# Patient Record
Sex: FEMALE | Race: WHITE | Employment: FULL TIME | URBAN - METROPOLITAN AREA
[De-identification: names, ages, dates, MRNs, and addresses within clinical notes are randomized per-mention and may not be internally consistent; named-entity substitution may affect disease eponyms.]

---

## 2017-02-02 ENCOUNTER — APPOINTMENT (OUTPATIENT)
Dept: GENERAL RADIOLOGY | Facility: HOSPITAL | Age: 42
End: 2017-02-02
Attending: EMERGENCY MEDICINE
Payer: COMMERCIAL

## 2017-02-02 ENCOUNTER — HOSPITAL ENCOUNTER (EMERGENCY)
Facility: HOSPITAL | Age: 42
Discharge: HOME | End: 2017-02-02
Attending: EMERGENCY MEDICINE
Payer: COMMERCIAL

## 2017-02-02 VITALS
WEIGHT: 170 LBS | TEMPERATURE: 98 F | OXYGEN SATURATION: 100 % | RESPIRATION RATE: 16 BRPM | HEIGHT: 63 IN | BODY MASS INDEX: 30.12 KG/M2 | SYSTOLIC BLOOD PRESSURE: 115 MMHG | DIASTOLIC BLOOD PRESSURE: 74 MMHG | HEART RATE: 80 BPM

## 2017-02-02 DIAGNOSIS — M54.50 BACK PAIN, LUMBOSACRAL: Primary | ICD-10-CM

## 2017-02-02 PROCEDURE — 99283 EMERGENCY DEPT VISIT LOW MDM: CPT

## 2017-02-02 PROCEDURE — 72220 X-RAY EXAM SACRUM TAILBONE: CPT

## 2017-02-02 PROCEDURE — 72110 X-RAY EXAM L-2 SPINE 4/>VWS: CPT

## 2017-02-02 RX ORDER — IBUPROFEN 600 MG/1
600 TABLET ORAL EVERY 8 HOURS PRN
Qty: 30 TABLET | Refills: 0 | Status: SHIPPED | OUTPATIENT
Start: 2017-02-02 | End: 2017-02-09

## 2017-02-02 RX ORDER — HYDROCODONE BITARTRATE AND ACETAMINOPHEN 5; 325 MG/1; MG/1
1-2 TABLET ORAL EVERY 4 HOURS PRN
Qty: 10 TABLET | Refills: 0 | Status: SHIPPED | OUTPATIENT
Start: 2017-02-02 | End: 2017-02-09

## 2017-02-02 NOTE — ED PROVIDER NOTES
Patient Seen in: [facility] Emergency Department    History   Patient presents with:  Back Pain (musculoskeletal)    Stated Complaint: back pain post fall    HPI    53-year-old white female who presents to the emergency room today for complaint of b Alcohol Use: Yes                Comment: Soc      Review of Systems    Positive for stated complaint: back pain post fall  Other systems are as noted in HPI. Constitutional and vital signs reviewed.       All other systems reviewed and negative except as n Impression:      CONCLUSION:  Grade 1 spondylolisthesis L5-S1 with suggestion of right-sided spondylolysis.     Degenerative disc disease L4-5.     IUD incidentally noted in the pelvis .           Sacrococcyx x-rays revealed:  Impression:      CONCLUSION

## 2017-02-02 NOTE — ED NOTES
Discharge paperwork reviewed with pt, copy given. Pt given prescriptions for Norco and ibuprofen. Instructed on Norco use. No questions at time of discharge.

## 2017-02-02 NOTE — ED INITIAL ASSESSMENT (HPI)
Pt slipped this morning and fell on to her tailbone causing pain to her lower back. Pt states she already has a herniated disc to her lower back that always has some discomfort but this pain is more severe.  Denies any weakness to extremities

## 2017-10-19 ENCOUNTER — OFFICE VISIT (OUTPATIENT)
Dept: FAMILY MEDICINE CLINIC | Facility: CLINIC | Age: 42
End: 2017-10-19

## 2017-10-19 VITALS
DIASTOLIC BLOOD PRESSURE: 84 MMHG | HEIGHT: 63 IN | RESPIRATION RATE: 16 BRPM | BODY MASS INDEX: 31.54 KG/M2 | WEIGHT: 178 LBS | OXYGEN SATURATION: 98 % | HEART RATE: 92 BPM | SYSTOLIC BLOOD PRESSURE: 136 MMHG | TEMPERATURE: 99 F

## 2017-10-19 DIAGNOSIS — J02.9 SORE THROAT: Primary | ICD-10-CM

## 2017-10-19 DIAGNOSIS — J34.89 NASAL SORE: ICD-10-CM

## 2017-10-19 DIAGNOSIS — J03.90 TONSILLITIS: ICD-10-CM

## 2017-10-19 PROCEDURE — 87880 STREP A ASSAY W/OPTIC: CPT | Performed by: NURSE PRACTITIONER

## 2017-10-19 PROCEDURE — 99213 OFFICE O/P EST LOW 20 MIN: CPT | Performed by: NURSE PRACTITIONER

## 2017-10-19 RX ORDER — PREDNISONE 20 MG/1
40 TABLET ORAL DAILY
Qty: 6 TABLET | Refills: 0 | Status: SHIPPED | OUTPATIENT
Start: 2017-10-19 | End: 2017-10-22

## 2017-10-19 NOTE — PATIENT INSTRUCTIONS
· Please start Benadryl tonight for sleep if needed. · Tomorrow morning start Prednisone 40 mg daily for three days. May make you jumpy. Take Tylenol only for pain if needed, avoid NSAIDS such as Motrin/Aleve/Ibuprofen during prednisone use.   · Follow up throat, children can sip on juice or a popsicle. Children 5 years and older can also suck on a lollipop or hard candy. · Avoid salty or spicy foods, which can be irritating to the throat.   Follow-up care  Follow up with your healthcare provider or our sta

## 2017-10-20 NOTE — PROGRESS NOTES
HPI:    Patient ID: Saumya Carney is a 46year old female. Patient presents with complaint of sensation of a \"ball in my throat\" that started this afternoon.  States she did not pay much attention and was able to eat lunch, but on her way home on -STREP A ASSAY W/OPTIC   Collection Time: 10/19/17  6:49 PM   Result Value Ref Range   STREP GRP A CUL-SCR negative Negative   Control Line Present with a clear background (yes/no) yes Yes/No   Kit Lot # [id number] Numeric   Kit Expiration Date 11/30/18 Da · For children: Use acetaminophen for fever, fussiness or discomfort. In infants over six months of age, you may use ibuprofen instead of acetaminophen.  (NOTE: If your child has chronic liver or kidney disease or ever had a stomach ulcer or GI bleeding, ta · Signs of dehydration (very dark urine or no urine, sunken eyes, dizziness)  · Trouble breathing or noisy breathing  · Muffled voice  · New rash  · Child appears to be getting sicker  Date Last Reviewed: 4/13/2015  © 2595-1203 [employer].  8

## 2018-03-20 PROCEDURE — 88175 CYTOPATH C/V AUTO FLUID REDO: CPT | Performed by: OBSTETRICS & GYNECOLOGY

## 2018-03-20 PROCEDURE — 87624 HPV HI-RISK TYP POOLED RSLT: CPT | Performed by: OBSTETRICS & GYNECOLOGY

## 2018-05-21 ENCOUNTER — OFFICE VISIT (OUTPATIENT)
Dept: FAMILY MEDICINE CLINIC | Facility: CLINIC | Age: 43
End: 2018-05-21

## 2018-05-21 VITALS
HEIGHT: 63 IN | BODY MASS INDEX: 32.78 KG/M2 | OXYGEN SATURATION: 98 % | DIASTOLIC BLOOD PRESSURE: 80 MMHG | SYSTOLIC BLOOD PRESSURE: 142 MMHG | WEIGHT: 185 LBS | HEART RATE: 82 BPM | RESPIRATION RATE: 18 BRPM | TEMPERATURE: 99 F

## 2018-05-21 DIAGNOSIS — J06.9 UPPER RESPIRATORY TRACT INFECTION, UNSPECIFIED TYPE: Primary | ICD-10-CM

## 2018-05-21 PROCEDURE — 99213 OFFICE O/P EST LOW 20 MIN: CPT | Performed by: NURSE PRACTITIONER

## 2018-09-17 ENCOUNTER — OFFICE VISIT (OUTPATIENT)
Dept: FAMILY MEDICINE CLINIC | Facility: CLINIC | Age: 43
End: 2018-09-17
Payer: COMMERCIAL

## 2018-09-17 VITALS
OXYGEN SATURATION: 98 % | TEMPERATURE: 99 F | HEIGHT: 63 IN | WEIGHT: 191 LBS | DIASTOLIC BLOOD PRESSURE: 68 MMHG | SYSTOLIC BLOOD PRESSURE: 102 MMHG | HEART RATE: 72 BPM | BODY MASS INDEX: 33.84 KG/M2 | RESPIRATION RATE: 20 BRPM

## 2018-09-17 DIAGNOSIS — Z20.818 EXPOSURE TO STREP THROAT: Primary | ICD-10-CM

## 2018-09-17 DIAGNOSIS — J06.9 VIRAL URI: ICD-10-CM

## 2018-09-17 LAB
CONTROL LINE PRESENT WITH A CLEAR BACKGROUND (YES/NO): YES YES/NO
STREP GRP A CUL-SCR: NEGATIVE

## 2018-09-17 PROCEDURE — 87081 CULTURE SCREEN ONLY: CPT | Performed by: NURSE PRACTITIONER

## 2018-09-17 PROCEDURE — 87880 STREP A ASSAY W/OPTIC: CPT | Performed by: NURSE PRACTITIONER

## 2018-09-17 PROCEDURE — 99213 OFFICE O/P EST LOW 20 MIN: CPT | Performed by: NURSE PRACTITIONER

## 2018-09-17 NOTE — PROGRESS NOTES
CHIEF COMPLAINT:   Patient presents with:  Cold: congestion, exposed to strep        HPI:   Kayy Larsen is a 38year old female presents to clinic with complaint of sore throat. Symptoms began last night.   Pt was exposed to strep at home -  THROAT: oral mucosa pink, moist. Posterior pharynx not erythematous and injected. No exudates. Tonsils WNL. Breath not malodorous   NECK: supple  LUNGS: clear to auscultation bilaterally, no wheezes or rhonchi. Breathing is non labored.   CARDIO: RRR witho You have a viral upper respiratory illness (URI), which is another term for the common cold. This illness is contagious during the first few days. It is spread through the air by coughing and sneezing.  It may also be spread by direct contact (touching the · Cough with lots of colored sputum (mucus)  · Severe headache; face, neck, or ear pain  · Difficulty swallowing due to throat pain  · Fever of 100.4°F (38°C) or higher, or as directed by your healthcare provider  Call 911  Call 911 if any of these occur:

## 2018-12-02 ENCOUNTER — OFFICE VISIT (OUTPATIENT)
Dept: FAMILY MEDICINE CLINIC | Facility: CLINIC | Age: 43
End: 2018-12-02
Payer: COMMERCIAL

## 2018-12-02 VITALS
OXYGEN SATURATION: 98 % | WEIGHT: 190 LBS | TEMPERATURE: 98 F | RESPIRATION RATE: 20 BRPM | DIASTOLIC BLOOD PRESSURE: 82 MMHG | BODY MASS INDEX: 34 KG/M2 | SYSTOLIC BLOOD PRESSURE: 122 MMHG | HEART RATE: 88 BPM

## 2018-12-02 DIAGNOSIS — S90.811A ABRASION OF RIGHT FOOT, INITIAL ENCOUNTER: Primary | ICD-10-CM

## 2018-12-02 PROCEDURE — 99213 OFFICE O/P EST LOW 20 MIN: CPT | Performed by: NURSE PRACTITIONER

## 2018-12-02 NOTE — PATIENT INSTRUCTIONS
Abrasions  Abrasions are skin scrapes. Their treatment depends on how large and deep the abrasion is. Home care  You may be prescribed an antibiotic cream or ointment to apply to the wound. This helps prevent infection.  Follow instructions when using th · Increasing pain, redness, swelling, or drainage from the wound  · Bleeding from the wound that does not stop after a few minutes of steady, firm pressure  · Decreased ability to move any body part near the wound  Date Last Reviewed: 3/3/2017  © 5587-9279

## 2018-12-02 NOTE — PROGRESS NOTES
Patient presents with: Foot Injury: right foot      HPI:   Desire Edwards is a 38year old female presents to clinic after sustaining an injury to her right foot.   She reports visiting her neighbors late at night and thinks she stepped on a nail in th the skin or numbness.     EXAM:   /82   Pulse 88   Temp 97.8 °F (36.6 °C) (Oral)   Resp 20   Wt 190 lb   SpO2 98%   BMI 33.66 kg/m²   GENERAL: well developed, well nourished,in no apparent distress  SKIN: lateral aspect of right foot with a small shanice

## 2019-03-27 ENCOUNTER — OFFICE VISIT (OUTPATIENT)
Dept: FAMILY MEDICINE CLINIC | Facility: CLINIC | Age: 44
End: 2019-03-27
Payer: COMMERCIAL

## 2019-03-27 VITALS
BODY MASS INDEX: 32.25 KG/M2 | HEIGHT: 63 IN | OXYGEN SATURATION: 98 % | TEMPERATURE: 98 F | WEIGHT: 182 LBS | SYSTOLIC BLOOD PRESSURE: 122 MMHG | DIASTOLIC BLOOD PRESSURE: 78 MMHG | HEART RATE: 89 BPM

## 2019-03-27 DIAGNOSIS — J02.9 SORE THROAT: ICD-10-CM

## 2019-03-27 DIAGNOSIS — R05.9 COUGH IN ADULT: Primary | ICD-10-CM

## 2019-03-27 DIAGNOSIS — R09.82 POST-NASAL DRIP: ICD-10-CM

## 2019-03-27 PROCEDURE — 99213 OFFICE O/P EST LOW 20 MIN: CPT | Performed by: NURSE PRACTITIONER

## 2019-03-27 RX ORDER — BENZONATATE 200 MG/1
200 CAPSULE ORAL 3 TIMES DAILY PRN
Qty: 30 CAPSULE | Refills: 0 | Status: SHIPPED | OUTPATIENT
Start: 2019-03-27 | End: 2019-04-06

## 2019-03-27 NOTE — PATIENT INSTRUCTIONS
Self care for viral illnesses:  Benzonatate perles every eight hours with large glass of water for cough as needed. May make you drowsy. · Salt water gargles (1 tsp.  Salt in 6 oz lukewarm water), use several times daily to help decrease swelling and pain

## 2019-03-27 NOTE — PROGRESS NOTES
HPI:   Selwyn Davila is a 41year old female who presents with ill symptoms for  2  weeks. Patient reports began as congestion and sneezing, mostly resolved, with now cough keeping awake and some left sided sore throat.  Has tried Motrin with mild inte benzonatate 200 MG Oral Cap; Take 1 capsule (200 mg total) by mouth 3 (three) times daily as needed for cough. Sore throat    Post-nasal drip      Benzonatate for cough. Self care discussed. Medication use and risk/benefit discussed.  Patient is advised

## 2019-04-15 ENCOUNTER — OFFICE VISIT (OUTPATIENT)
Dept: FAMILY MEDICINE CLINIC | Facility: CLINIC | Age: 44
End: 2019-04-15
Payer: COMMERCIAL

## 2019-04-15 VITALS
HEART RATE: 78 BPM | OXYGEN SATURATION: 98 % | BODY MASS INDEX: 32 KG/M2 | WEIGHT: 179 LBS | RESPIRATION RATE: 16 BRPM | DIASTOLIC BLOOD PRESSURE: 90 MMHG | TEMPERATURE: 99 F | SYSTOLIC BLOOD PRESSURE: 130 MMHG

## 2019-04-15 DIAGNOSIS — J01.00 ACUTE NON-RECURRENT MAXILLARY SINUSITIS: Primary | ICD-10-CM

## 2019-04-15 PROCEDURE — 99213 OFFICE O/P EST LOW 20 MIN: CPT | Performed by: NURSE PRACTITIONER

## 2019-04-15 RX ORDER — AMOXICILLIN AND CLAVULANATE POTASSIUM 875; 125 MG/1; MG/1
1 TABLET, FILM COATED ORAL 2 TIMES DAILY
Qty: 20 TABLET | Refills: 0 | Status: SHIPPED | OUTPATIENT
Start: 2019-04-15 | End: 2019-04-25

## 2019-04-15 NOTE — PROGRESS NOTES
CHIEF COMPLAINT:   Patient presents with:  Cough: cough, sore throat, post nasal drip       HPI:   Devi Babcock is a 41year old female who presents for cold symptoms for  5  weeks.  Symptoms have progressed into sinus congestion and been worsening si BMI 31.71 kg/m²   GENERAL: well developed, well nourished,in no apparent distress  SKIN: no rashes,no suspicious lesions  HEAD: atraumatic, normocephalic, +  tenderness on palpation of maxillary sinuses  EYES: conjunctiva clear, EOM intact  EARS: TM's pear

## 2019-11-15 ENCOUNTER — OFFICE VISIT (OUTPATIENT)
Dept: FAMILY MEDICINE CLINIC | Facility: CLINIC | Age: 44
End: 2019-11-15
Payer: COMMERCIAL

## 2019-11-15 VITALS
TEMPERATURE: 99 F | DIASTOLIC BLOOD PRESSURE: 76 MMHG | RESPIRATION RATE: 20 BRPM | BODY MASS INDEX: 32.43 KG/M2 | HEIGHT: 63 IN | WEIGHT: 183 LBS | OXYGEN SATURATION: 97 % | SYSTOLIC BLOOD PRESSURE: 122 MMHG | HEART RATE: 86 BPM

## 2019-11-15 DIAGNOSIS — R07.89 CHEST PRESSURE: Primary | ICD-10-CM

## 2019-11-15 DIAGNOSIS — F17.200 SMOKER: ICD-10-CM

## 2019-11-15 PROCEDURE — 99213 OFFICE O/P EST LOW 20 MIN: CPT | Performed by: FAMILY MEDICINE

## 2019-11-15 NOTE — PROGRESS NOTES
Claude Priest is a 41year old female. S:  Patient presents today with the following concerns:  Chest Pain (left side of chest, left hand tingling x1day)    Now feeling pressure across entire chest.  No dyspnea or wheezing.   Smokes 3 cigarettes per the defined types were placed in this encounter. Meds & Refills for this Visit:  Requested Prescriptions      No prescriptions requested or ordered in this encounter     Imaging & Consults:  None    Discussed limitations of [place].   Unable to fully work up

## 2020-09-18 ENCOUNTER — LAB ENCOUNTER (OUTPATIENT)
Dept: LAB | Age: 45
End: 2020-09-18
Attending: INTERNAL MEDICINE
Payer: COMMERCIAL

## 2020-09-18 DIAGNOSIS — R10.10 PAIN OF UPPER ABDOMEN: ICD-10-CM

## 2020-09-18 DIAGNOSIS — R14.0 BLOATING: ICD-10-CM

## 2020-09-18 LAB
ALBUMIN SERPL-MCNC: 3.6 G/DL (ref 3.4–5)
ALBUMIN/GLOB SERPL: 0.8 {RATIO} (ref 1–2)
ALP LIVER SERPL-CCNC: 70 U/L (ref 37–98)
ALT SERPL-CCNC: 29 U/L (ref 13–56)
ANION GAP SERPL CALC-SCNC: 5 MMOL/L (ref 0–18)
AST SERPL-CCNC: 24 U/L (ref 15–37)
BASOPHILS # BLD AUTO: 0.04 X10(3) UL (ref 0–0.2)
BASOPHILS NFR BLD AUTO: 0.5 %
BILIRUB SERPL-MCNC: 0.5 MG/DL (ref 0.1–2)
BUN BLD-MCNC: 13 MG/DL (ref 7–18)
BUN/CREAT SERPL: 20 (ref 10–20)
CALCIUM BLD-MCNC: 9.3 MG/DL (ref 8.5–10.1)
CHLORIDE SERPL-SCNC: 104 MMOL/L (ref 98–112)
CO2 SERPL-SCNC: 29 MMOL/L (ref 21–32)
CREAT BLD-MCNC: 0.65 MG/DL (ref 0.55–1.02)
DEPRECATED RDW RBC AUTO: 43.7 FL (ref 35.1–46.3)
EOSINOPHIL # BLD AUTO: 0.13 X10(3) UL (ref 0–0.7)
EOSINOPHIL NFR BLD AUTO: 1.6 %
ERYTHROCYTE [DISTWIDTH] IN BLOOD BY AUTOMATED COUNT: 13.1 % (ref 11–15)
GLOBULIN PLAS-MCNC: 4.6 G/DL (ref 2.8–4.4)
GLUCOSE BLD-MCNC: 91 MG/DL (ref 70–99)
HCT VFR BLD AUTO: 39.5 % (ref 35–48)
HGB BLD-MCNC: 12.9 G/DL (ref 12–16)
IGA SERPL-MCNC: 311 MG/DL (ref 70–312)
IMM GRANULOCYTES # BLD AUTO: 0.02 X10(3) UL (ref 0–1)
IMM GRANULOCYTES NFR BLD: 0.2 %
LIPASE SERPL-CCNC: 154 U/L (ref 73–393)
LYMPHOCYTES # BLD AUTO: 2.41 X10(3) UL (ref 1–4)
LYMPHOCYTES NFR BLD AUTO: 29.4 %
M PROTEIN MFR SERPL ELPH: 8.2 G/DL (ref 6.4–8.2)
MCH RBC QN AUTO: 30.1 PG (ref 26–34)
MCHC RBC AUTO-ENTMCNC: 32.7 G/DL (ref 31–37)
MCV RBC AUTO: 92.3 FL (ref 80–100)
MONOCYTES # BLD AUTO: 0.42 X10(3) UL (ref 0.1–1)
MONOCYTES NFR BLD AUTO: 5.1 %
NEUTROPHILS # BLD AUTO: 5.18 X10 (3) UL (ref 1.5–7.7)
NEUTROPHILS # BLD AUTO: 5.18 X10(3) UL (ref 1.5–7.7)
NEUTROPHILS NFR BLD AUTO: 63.2 %
OSMOLALITY SERPL CALC.SUM OF ELEC: 286 MOSM/KG (ref 275–295)
PATIENT FASTING Y/N/NP: YES
PLATELET # BLD AUTO: 336 10(3)UL (ref 150–450)
POTASSIUM SERPL-SCNC: 3.6 MMOL/L (ref 3.5–5.1)
RBC # BLD AUTO: 4.28 X10(6)UL (ref 3.8–5.3)
SODIUM SERPL-SCNC: 138 MMOL/L (ref 136–145)
WBC # BLD AUTO: 8.2 X10(3) UL (ref 4–11)

## 2020-09-18 PROCEDURE — 80053 COMPREHEN METABOLIC PANEL: CPT

## 2020-09-18 PROCEDURE — 83690 ASSAY OF LIPASE: CPT

## 2020-09-18 PROCEDURE — 83516 IMMUNOASSAY NONANTIBODY: CPT

## 2020-09-18 PROCEDURE — 36415 COLL VENOUS BLD VENIPUNCTURE: CPT

## 2020-09-18 PROCEDURE — 82784 ASSAY IGA/IGD/IGG/IGM EACH: CPT

## 2020-09-18 PROCEDURE — 85025 COMPLETE CBC W/AUTO DIFF WBC: CPT

## 2020-09-23 LAB — TTG IGA SER-ACNC: 0.5 U/ML (ref ?–7)

## 2020-09-30 ENCOUNTER — APPOINTMENT (OUTPATIENT)
Dept: LAB | Age: 45
End: 2020-09-30
Attending: INTERNAL MEDICINE
Payer: COMMERCIAL

## 2020-09-30 DIAGNOSIS — Z01.818 PRE-OP TESTING: ICD-10-CM

## 2020-10-02 PROBLEM — R14.0 ABDOMINAL BLOATING: Status: ACTIVE | Noted: 2020-10-02

## 2020-10-02 PROBLEM — R12 CHRONIC HEARTBURN: Status: ACTIVE | Noted: 2020-10-02

## 2025-02-03 ENCOUNTER — APPOINTMENT (OUTPATIENT)
Dept: GENERAL RADIOLOGY | Age: 50
End: 2025-02-03
Attending: NURSE PRACTITIONER
Payer: COMMERCIAL

## 2025-02-03 ENCOUNTER — APPOINTMENT (OUTPATIENT)
Dept: ULTRASOUND IMAGING | Age: 50
End: 2025-02-03
Attending: NURSE PRACTITIONER
Payer: COMMERCIAL

## 2025-02-03 ENCOUNTER — HOSPITAL ENCOUNTER (OUTPATIENT)
Age: 50
Discharge: ANOTHER HOSPITAL | End: 2025-02-03
Payer: COMMERCIAL

## 2025-02-03 VITALS
BODY MASS INDEX: 31.01 KG/M2 | DIASTOLIC BLOOD PRESSURE: 102 MMHG | HEART RATE: 82 BPM | WEIGHT: 175 LBS | OXYGEN SATURATION: 98 % | RESPIRATION RATE: 16 BRPM | TEMPERATURE: 99 F | HEIGHT: 63 IN | SYSTOLIC BLOOD PRESSURE: 152 MMHG

## 2025-02-03 DIAGNOSIS — S89.92XA INJURY OF LEFT LOWER EXTREMITY, INITIAL ENCOUNTER: ICD-10-CM

## 2025-02-03 DIAGNOSIS — S82.892A CLOSED FRACTURE OF LEFT ANKLE, INITIAL ENCOUNTER: ICD-10-CM

## 2025-02-03 DIAGNOSIS — M79.89 LEFT LEG SWELLING: Primary | ICD-10-CM

## 2025-02-03 PROCEDURE — 99203 OFFICE O/P NEW LOW 30 MIN: CPT | Performed by: NURSE PRACTITIONER

## 2025-02-03 PROCEDURE — 73610 X-RAY EXAM OF ANKLE: CPT | Performed by: NURSE PRACTITIONER

## 2025-02-03 PROCEDURE — 29515 APPLICATION SHORT LEG SPLINT: CPT | Performed by: NURSE PRACTITIONER

## 2025-02-03 PROCEDURE — 73630 X-RAY EXAM OF FOOT: CPT | Performed by: NURSE PRACTITIONER

## 2025-02-03 RX ORDER — HYDROCODONE BITARTRATE AND ACETAMINOPHEN 5; 325 MG/1; MG/1
1 TABLET ORAL ONCE
Status: COMPLETED | OUTPATIENT
Start: 2025-02-03 | End: 2025-02-03

## 2025-02-03 NOTE — ED PROVIDER NOTES
Patient Seen in: [facility]      History     Chief Complaint   Patient presents with    Leg or Foot Injury     Stated Complaint: l ankle injury    Subjective:   49-year-old female, who presents with left lower leg pain.  States she initially injured her left ankle/foot a week ago.  States she did this by missing the last stair and rolling her ankle.  States she did not come in right away because she did not have insurance done.  She now has insurance.  She is also concerned because now the whole left lower leg is also swollen.  Has been taking Advil with minimal relief.  She is not complaining of difficulty breathing/shortness of breath.              Objective:     Past Medical History:    Abdominal distention    Anxiety    Belching    Bloating    Flatulence/gas pain/belching    Heartburn    Hemorrhoids    Indigestion    Stress    Wears glasses              Past Surgical History:   Procedure Laterality Date    Mirena, iud  3-14-14    Other      R ankle surgery    Other surgical history  2004    right ankle                No pertinent social history.            Review of Systems   Constitutional: Negative.    Musculoskeletal:         Left lower leg injury   All other systems reviewed and are negative.      Positive for stated complaint: l ankle injury  Other systems are as noted in HPI.  Constitutional and vital signs reviewed.      All other systems reviewed and negative except as noted above.    Physical Exam     ED Triage Vitals [02/03/25 1350]   BP (!) 148/92   Pulse 82   Resp 16   Temp 98.9 °F (37.2 °C)   Temp src Oral   SpO2 98 %   O2 Device None (Room air)       Current Vitals:   Vital Signs  BP: (!) 148/92  Pulse: 82  Resp: 16  Temp: 98.9 °F (37.2 °C)  Temp src: Oral    Oxygen Therapy  SpO2: 98 %  O2 Device: None (Room air)        Physical Exam  Vitals and nursing note reviewed.   Constitutional:       Appearance: Normal appearance. She is not ill-appearing, toxic-appearing or diaphoretic.    Pulmonary:      Effort: No respiratory distress.   Musculoskeletal:      Left lower leg: Swelling present. No deformity, lacerations, tenderness or bony tenderness.      Left ankle: Swelling present. No deformity. Tenderness present over the lateral malleolus. Normal range of motion. Normal pulse.      Left Achilles Tendon: Normal. No tenderness.      Left foot: Normal range of motion and normal capillary refill. Swelling and tenderness present. No deformity, bony tenderness or crepitus. Normal pulse.   Skin:     Capillary Refill: Capillary refill takes less than 2 seconds.   Neurological:      General: No focal deficit present.      Mental Status: She is alert and oriented to person, place, and time.   Psychiatric:         Mood and Affect: Mood normal.         Behavior: Behavior normal.             ED Course   Labs Reviewed - No data to display  XR FOOT, COMPLETE (MIN 3 VIEWS), LEFT (CPT=73630)    Result Date: 2/3/2025  PROCEDURE:  XR FOOT, COMPLETE (MIN 3 VIEWS), LEFT (CPT=73630)  TECHNIQUE:  AP, oblique, and lateral views were obtained.  COMPARISON:  [facility], XR, XR ANKLE (MIN 3 VIEWS), LEFT (CPT=73610), 2/03/2025, 2:14 PM.  INDICATIONS:  l ankle injury  PATIENT STATED HISTORY: (As transcribed by Technologist)  The patient rolled her left ankle one week ago. Bruising, pain and deformity noted.    FINDINGS:  Ankle fracture dislocation as dictated separately.  No acute fracture or dislocation involving the left foot.  Joint spaces are maintained.  No radiopaque foreign body.             CONCLUSION:  No acute osseous findings in the foot.   LOCATION:  [facility]   Dictated by (CST): Aaron Smith MD on 2/03/2025 at 2:42 PM     Finalized by (CST): Aaron Smith MD on 2/03/2025 at 2:43 PM       XR ANKLE (MIN 3 VIEWS), LEFT (CPT=73610)    Result Date: 2/3/2025  PROCEDURE:  XR ANKLE (MIN 3 VIEWS), LEFT (CPT=73610)  TECHNIQUE:  Three views were obtained.  COMPARISON:  None.  INDICATIONS:  l ankle  injury  PATIENT STATED HISTORY: (As transcribed by Technologist)  The patient rolled her left ankle one week ago. Bruising, pain and deformity noted.    FINDINGS:  There is a fracture dislocation involving the left ankle.  There is medial dislocation of the tibia relative to the talus approximately 2.5 cm.  There is a comminuted displaced fracture involving the distal fibula with approximately 2.9 centimeters of cortical displacement.  There is soft tissue edema.  No radiopaque foreign body.            CONCLUSION:  Left ankle fracture dislocation as described above.   LOCATION:  [facility]   Dictated by (CST): Aaron Smith MD on 2/03/2025 at 2:38 PM     Finalized by (CST): Aaron Smith MD on 2/03/2025 at 2:39 PM                    MDM              Medical Decision Making  Differential diagnosis initially included but was not limited to: Contusion, sprain, fracture    Nontoxic adult female patient with a left lower leg injury.  No difficulty breathing/shortness of breath.  No vascular deficits.    I personally viewed, independently interpreted and radiology report was reviewed.  Left ankle dislocated fracture.  I discussed this case with my supervising physician for today, Dr. Cuellar.  Placed patient on a short leg splint.    Will need better pain management, and for what most likely needs to be a reduction of the injury in the ER.  Explained the plan with the patient she voices understanding agrees with the plan of care.  States she will go to [facility] emergency department in [place].  I did offer an ambulance/private ambulance ride which she declined.  States she will call and over to drive her directly there.  She left in stable condition.    Supportive/home management of diagnosis/illness/injury discussed. Red flag symptoms discussed.  Signs and symptoms/criteria that would necessitate reevaluation, including ER evaluation discussed.  Patient and/or responsible adult verbalize and agree with management and plan  of care.    Speech recognition software was used during this dictation.  There may be minor errors in transcription.      Amount and/or Complexity of Data Reviewed  Radiology: ordered. Decision-making details documented in ED Course.  ECG/medicine tests: ordered. Decision-making details documented in ED Course.        Disposition and Plan     Clinical Impression:  1. Left leg swelling    2. Injury of left lower extremity, initial encounter    3. Closed fracture of left ankle, initial encounter         Disposition:  Ic to ed  2/3/2025  3:00 pm    Follow-up:  Dayna Frank MD  [address] [place] [id number]  [phone]    Call in 1 day  Orthopedic recommendation          Medications Prescribed:  Current Discharge Medication List              Supplementary Documentation:

## 2025-02-03 NOTE — ED INITIAL ASSESSMENT (HPI)
Pt states she rolled lt ankle when stepping off the last stair.   C/o pain/swelling to left ankle.    Swelling noted to foot/lower leg.

## 2025-02-03 NOTE — DISCHARGE INSTRUCTIONS
Please go directly to [facility] emergency department in [place], for further evaluation of your injury.

## 2025-05-03 ENCOUNTER — APPOINTMENT (OUTPATIENT)
Dept: CT IMAGING | Age: 50
End: 2025-05-03
Attending: NURSE PRACTITIONER
Payer: COMMERCIAL

## 2025-05-03 ENCOUNTER — HOSPITAL ENCOUNTER (OUTPATIENT)
Age: 50
Discharge: HOME | End: 2025-05-03
Payer: COMMERCIAL

## 2025-05-03 VITALS
DIASTOLIC BLOOD PRESSURE: 85 MMHG | SYSTOLIC BLOOD PRESSURE: 127 MMHG | OXYGEN SATURATION: 97 % | TEMPERATURE: 99 F | RESPIRATION RATE: 20 BRPM | HEART RATE: 92 BPM

## 2025-05-03 DIAGNOSIS — S09.90XA TRAUMATIC HEAD INJURY WITH MULTIPLE LACERATIONS, INITIAL ENCOUNTER: ICD-10-CM

## 2025-05-03 DIAGNOSIS — S01.91XA TRAUMATIC HEAD INJURY WITH MULTIPLE LACERATIONS, INITIAL ENCOUNTER: ICD-10-CM

## 2025-05-03 DIAGNOSIS — S02.2XXB OPEN FRACTURE OF NASAL BONE, INITIAL ENCOUNTER: ICD-10-CM
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
## 2025-05-03 DIAGNOSIS — S02.32XA CLOSED FRACTURE OF LEFT ORBITAL FLOOR, INITIAL ENCOUNTER (HCC): Primary | ICD-10-CM

## 2025-05-03 PROCEDURE — 70450 CT HEAD/BRAIN W/O DYE: CPT | Performed by: NURSE PRACTITIONER

## 2025-05-03 PROCEDURE — 70486 CT MAXILLOFACIAL W/O DYE: CPT | Performed by: NURSE PRACTITIONER

## 2025-05-03 PROCEDURE — 90471 IMMUNIZATION ADMIN: CPT | Performed by: NURSE PRACTITIONER

## 2025-05-03 PROCEDURE — 72125 CT NECK SPINE W/O DYE: CPT | Performed by: NURSE PRACTITIONER

## 2025-05-03 PROCEDURE — A9150 MISC/EXPER NON-PRESCRIPT DRU: HCPCS | Performed by: NURSE PRACTITIONER

## 2025-05-03 PROCEDURE — 12015 RPR F/E/E/N/L/M 7.6-12.5 CM: CPT | Performed by: NURSE PRACTITIONER

## 2025-05-03 PROCEDURE — 90715 TDAP VACCINE 7 YRS/> IM: CPT | Performed by: NURSE PRACTITIONER

## 2025-05-03 PROCEDURE — 99214 OFFICE O/P EST MOD 30 MIN: CPT | Performed by: NURSE PRACTITIONER

## 2025-05-03 RX ORDER — ACETAMINOPHEN 500 MG
1000 TABLET ORAL ONCE
Status: COMPLETED | OUTPATIENT
Start: 2025-05-03 | End: 2025-05-03

## 2025-05-03 NOTE — DISCHARGE INSTRUCTIONS
No straining, nose blowing. No heavy lifting, sneezing with mouth open.   Make an apt to have your sutures/staples removed in 7 days. Keep the laceration dry for the first 24 hours. After 24 hours, you should be washing/cleaning the wound 3x daily with regular soap and water. Pat dry, apply antibiotic ointment, and be sure to keep the area clean and dry. You may shower as usual, but do not submerge in water, i.e. sink/bath/pool. To help minimize risk of scaring, keep the area covered and use sunscreen when sun exposure is present. Apply ice to the area to decrease swelling. You may take Tylenol or Motrin as needed for discomfort. Watch for signs of infection such as increased surrounding redness, swelling, drainage, fever. Follow-up with the specialist provided in the next 3 days. Return to the Immediate Care or seek care in the ER for new or worsening symptoms, fever.

## 2025-05-03 NOTE — ED PROVIDER NOTES
Patient Seen in: [facility]    History   CC: head injury  HPI: Ainsley Vargas 49 year old female w/ EtOH use disorder who presents c/o left forehead lacerations x2 s/p injury last night, unsure of what time per pt. Unsure of loc. Attributes fall to \"too much to drink\". States she went back to bed. Denies dizziness, headache, vision changes, weakness, or pain/injury elsewhere associated. Last tetanus unknown. Denies neck/back pain.  Denies use of anticoagulant or antiplatelet therapies.    Past Medical History[1]    Past Surgical History[2]    Family History[3]    Short Social Hx on File[4]    ROS:  Systems reviewed: All pertinent positives noted in HPI. Unless otherwise noted, additional systems reviewed are negative.   Vital signs reviewed.    Positive for stated complaint: Head Lac  Other systems are as noted in HPI.  Constitutional and vital signs reviewed.      All other systems reviewed and negative except as noted above.    PSFH elements reviewed from today and agreed except as otherwise stated in HPI.             Constitutional and vital signs reviewed.        Physical Exam     ED Triage Vitals [05/03/25 1146]   /85   Pulse 92   Resp 20   Temp 98.5 °F (36.9 °C)   Temp src Oral   SpO2 97 %   O2 Device None (Room air)       Current:/85   Pulse 92   Temp 98.5 °F (36.9 °C) (Oral)   Resp 20   LMP  (LMP Unknown)   SpO2 97%         PE:  General - Appears well, non-toxic and in NAD  Head - +there is a large 7 cm well approximated however gaping laceration noted to the left forehead as well as a 2cm left lateral eyebrow gaping and well approximated laceration. No surrounding erythema or edema. Not currently bleeding.   Eyes - PERRLA, sclera not injected, no discharge noted, no periorbital edema, no pain/difficulty with EOM  ENT - EAC bilaterally without discharge, TM pearly grey with COL visualized appropriately bilaterally.   no nasal drainage noted in nares bilat, no cobblestoning  to post. Pharynx.   Oropharynx clear, voice is clear.   Neck - supple with trachea midline, no tenderness upon palpation to posterior c-spine, no obvious sign of trauma/swelling/step-off, full ROM with strong motor strength against resistance  Resp - Lung sounds clear bilaterally and wob unlabored, good aeration with equal, even expansion bilaterally   CV - RRR  Skin -see head exam note.  Skin is otherwise pink warm and dry throughout, mmm, cap refill <2seconds  Neuro - A&O x4, sensation equal to both medial and lateral aspects of extremities, steady gait  MSK - makes purposeful movements of all extremities, radial pulses 2+ bilat.  no midline spinal tenderness or obvious sign of trauma/swelling/deformity, +flexion of the 1st and 5th toes bilat.  Psych - Interactive and appropriate      ED Course   Labs Reviewed - No data to display    MDM     CT FACIAL BONES (CPT=70486)   Final Result   PROCEDURE:  CT FACIAL BONES (CPT=70486)       COMPARISON:  None.       INDICATIONS:  facial swelling and laceration s/p etoh associated fall       TECHNIQUE:  Noncontrast CT scanning is performed through the facial bones.    3D shaded surface renderings are created on an independent CT scanner    workstation. Dose reduction techniques were used. Dose information is    transmitted to the ACR (American    College of Radiology) NRDR (National Radiology Data Registry) which    includes the Dose Index Registry.       3-D RENDERING:  Additional 3-D rendering was generated by the    technologist.       PATIENT STATED HISTORY:(As transcribed by Technologist)  Patient states    she fell last night at approximately 10pm.  Patient states she fell after    \"having too much to drink\". Denies LOC but is unable to recall events    surrounding fall.  Patient has    laceration across forehead and above left eye with swelling around left    eyebrow.             FINDINGS:     SINUSES:  Trace fluid of the left maxillary sinus.   NASAL FOSSA:  No mass,  fracture, or significant septal deviation.     SKULL BASE:  No mass or bone destruction.     FACIAL BONES:  Slight depression of the right lateral nasal bone    compatible with nondisplaced fracture of indeterminate chronicity (series    4, image 57).   ORBITS:  Depressed fracture of the left orbital floor with cortical defect    measuring 9 mm in transverse length and with extension of cortical bone    fragments and intraorbital fat into the adjacent left maxillary sinus    (series 6, image 27).  The fracture    involves the infraorbital foramen.  No herniation of the inferior rectus    muscle.  Mild volume of gas within the inferior aspect of the left orbit,    primarily preseptal.  Moderate left periorbital soft tissue swelling.   CAVERNOUS SINUS:  Symmetric appearance with no visible lesion.     SALIVARY GLANDS:  The parotid and submandibular glands are unremarkable.     OTHER:  The nasopharynx, oropharynx, and oral cavity are unremarkable.  No    lymphadenopathy.                           =====   CONCLUSION:         1. Depressed fracture of the left orbital floor involving the infraorbital    foramen.  Extension of cortical bone fragments and intraorbital fat into    the adjacent left maxillary sinus.  No evidence of herniation of the    inferior rectus muscle.  Mild volume    of gas within the inferior aspect of the left orbit, primarily preseptal.       2. Slight depression of the right lateral nasal bone compatible with    nondisplaced fracture, chronicity indeterminate.       3. Moderate left periorbital soft tissue swelling.             LOCATION:  [facility]           Dictated by (CST): Juan A Rick MD on 5/03/2025 at 12:41 PM        Finalized by (CST): Juan A Rick MD on 5/03/2025 at 12:49 PM         CT SPINE CERVICAL (CPT=72125)   Final Result   PROCEDURE:  CT SPINE CERVICAL (CPT=72125)       COMPARISON:  None.       INDICATIONS:  Fall with head and neck pain       TECHNIQUE:  Noncontrast CT scanning of the  cervical spine is performed    from the skull base through C7.  Multiplanar reconstructions are    generated.  Dose reduction techniques were used. Dose information is    transmitted to the ACR (American College of    Radiology) NRDR (National Radiology Data Registry) which includes the Dose    Index Registry.       PATIENT STATED HISTORY: (As transcribed by Technologist)  Patient states    she fell last night at approximately 10pm.  Patient states she fell after    \"having too much to drink\". Denies LOC but is unable to recall events    surrounding fall.  Patient has    laceration across forehead and above left eye with swelling around left    eyebrow.              FINDINGS:     Negative for fracture or malalignment of the cervical spine.     Straightening of the usual cervical lordosis may be positional or sequela    of muscle strain.       Intervertebral discs are preserved.  No significant central canal or    neural foraminal stenosis.  Lateral masses are aligned.  Intact odontoid    process.       No prevertebral edema.  No epidural or paraspinal collections.       Lung apices are clear.                         =====   CONCLUSION:         No evidence of acute traumatic injury of the cervical spine.               LOCATION:  [facility]           Dictated by (CST): Juan A Rick MD on 5/03/2025 at 12:38 PM        Finalized by (CST): Juan A Rick MD on 5/03/2025 at 12:40 PM         CT BRAIN OR HEAD (CPT=70450)   Final Result   PROCEDURE:  CT BRAIN OR HEAD (53979)       COMPARISON:  None.       INDICATIONS:  fall overnight, +etoh with fall, +head lac       TECHNIQUE:  Noncontrast CT scanning is performed through the brain. Dose    reduction techniques were used. Dose information is transmitted to the ACR    (American College of Radiology) NRDR (National Radiology Data Registry)    which includes the Dose Index    Registry.       PATIENT STATED HISTORY: (As transcribed by Technologist)  Patient states    she fell last night  at approximately 10pm.  Patient states she fell after    \"having too much to drink\". Denies LOC but is unable to recall events    surrounding fall.  Patient has    laceration across forehead and above left eye with swelling around left    eyebrow.             FINDINGS:     VENTRICLES/SULCI:  Ventricles and sulci are normal in size.     INTRACRANIAL:  No intracranial hemorrhage, mass effect, or acute large    vessel transcortical infarct.  Gray-white differentiation is preserved.   SINUSES:           Paranasal sinuses and mastoid air cells are clear.   SKULL:             No evidence for fracture or osseous abnormality.   OTHER:             Laceration of left frontal scalp.                         =====   CONCLUSION:         1. Negative for acute intracranial process.       2. Laceration of the left frontal scalp.  No organized hematoma.  No    calvarial fracture.                 LOCATION:  [facility]           Dictated by (CST): Juan A Rick MD on 5/03/2025 at 12:34 PM        Finalized by (CST): Juan A Rick MD on 5/03/2025 at 12:38 PM             DDx: intracranial bleeding, facial bone fx, cervical spine fracture, head laceration    Pt's  in the room after initial assessment and advises this provider that fall occurred this morning 0900 after they went to the store. Fall was unwitnessed however he found a small area of blood below a shelving unit at home and believes she fell into the shelves that protrude from the wall. States pt has been behaviorally acting her normal self since incident occurred. Pt declines resources for rehab or therapy to aide in etoh cessation.  states he has been unsuccessful in getting pt to agree to any outpt or inpt programs. Denies illicit substance use.     Procedure: LACERATION REPAIR  Verbal consent was obtained from the patient. The 7 cm laceration on the forehead was anesthetized using 1% Lidocaine. The wound was scrubbed, draped and explored to its base with a gloved  finger. There were no deep structures involved. No tendon injury was identified.  No foreign body was visualized or palpated.  The wound was repaired with 5.0 nylon - single continuous running suture. The wound repair was simple. The procedure was performed by myself. Pt tolerated the procedure well.    Procedure: LACERATION REPAIR  Verbal consent was obtained from the patient. The 2 cm laceration on the left eyebrow was anesthetized using 1% Lidocaine. The wound was scrubbed, draped and explored to its base with a gloved finger. There were no deep structures involved. No tendon injury was identified.  No foreign body was visualized or palpated.  The wound was repaired with 5.0 nylon - 5 simple interrupted sutures placed. The wound repair was simple. The procedure was performed by myself. Pt tolerated the procedure well.    CT results as noted above.  Negative head for intracranial abnormality.  Negative C-spine.  All CT results discussed with patient and her  who is also present. Tdap updated as chart review shows last tdap 2013.  CT facial bone results discussed with Dr. Lora Alvarez who is agreeable to outpatient close follow-up in the office this week as there is no vision changes or ocular nerve entrapment.  Does not need admission per Dr. Alvarez. She advises precautions including: No heavy lifting or straining, sneezing with the mouth open, not blowing the nose and taking Augmentin for the next 10 days.  This was reviewed with patient and .    Wound was cleaned prior to placement of stitches and topical antibiotic ointment applied following. Ice pack applied by this provider. Wound care for home extensively reviewed as well as general head injury instructions and precautions reviewed. Patient is historian and demonstrates understanding of all instruction and agrees with plan of care.  This case was also discussed with Dr. Barry who agrees with plan of care.      Disposition and Plan      Clinical Impression:  1. Closed fracture of left orbital floor, initial encounter ([facility])    2. Fall, initial encounter    3. Traumatic head injury with multiple lacerations, initial encounter    4. Open fracture of nasal bone, initial encounter        Disposition:  Discharge    Follow-up:  Lora Alvarez MD  [address] [facility] [address]  [phone]    Go in 2 days        Medications Prescribed:  Discharge Medication List as of 5/3/2025  1:59 PM        START taking these medications    Details   amoxicillin clavulanate 875-125 MG Oral Tab Take 1 tablet by mouth 2 (two) times daily for 10 days., Normal, Disp-20 tablet, R-0                            [1]   Past Medical History:   Abdominal distention    Anxiety    Belching    Bloating    Flatulence/gas pain/belching    Heartburn    Hemorrhoids    Indigestion    Stress    Wears glasses   [2]   Past Surgical History:  Procedure Laterality Date    Mirena, iud  3-14-14    Other      R ankle surgery    Other surgical history  2004    right ankle   [3]   Family History  Adopted: Yes   Problem Relation Age of Onset    Other (cervical cancer) Mother    [4]   Social History  Socioeconomic History    Marital status:    Tobacco Use    Smoking status: Some Days     Current packs/day: 0.00     Types: Cigarettes    Smokeless tobacco: Never    Tobacco comments:     Socially   Vaping Use    Vaping status: Never Used   Substance and Sexual Activity    Alcohol use: Yes     Comment: Soc    Drug use: No    Sexual activity: Yes     Partners: Male     Birth control/protection: Mirena     Social Drivers of Health     Food Insecurity: No Food Insecurity (4/1/2025)    Received from [facility]    Food Insecurity     Currently or in the past 3 months, have you worried your food would run out before you had money to buy more?: No     In the past 12 months, have you run out of food or been unable to get more?: No   Transportation Needs: No  Transportation Needs (4/1/2025)    Received from [facility]    Transportation Needs     Currently or in the past 3 months, has lack of transportation kept you from medical appointments, getting food or medicine, or providing care to a family member?: No     Medical Transportation Needs?: No

## 2025-05-03 NOTE — ED INITIAL ASSESSMENT (HPI)
Patient c/o laceration to left foreheads/p  fall that occurred last night at approximately 10pm after patient reports \"having too much to drink\", denies LOC but patient unable to recall events surrounding fall, unsure of cause of laceration, +headache, denies nausea

## 2025-05-10 ENCOUNTER — HOSPITAL ENCOUNTER (OUTPATIENT)
Age: 50
Discharge: HOME | End: 2025-05-10
Payer: COMMERCIAL

## 2025-05-10 VITALS
HEART RATE: 76 BPM | OXYGEN SATURATION: 99 % | TEMPERATURE: 98 F | RESPIRATION RATE: 18 BRPM | DIASTOLIC BLOOD PRESSURE: 92 MMHG | SYSTOLIC BLOOD PRESSURE: 132 MMHG

## 2025-05-10 DIAGNOSIS — Z48.02 ENCOUNTER FOR REMOVAL OF SUTURES: Primary | ICD-10-CM

## 2025-05-10 NOTE — DISCHARGE INSTRUCTIONS
Keep wound clean and dry. Ok to shower. Gently cleanse area with gentle soap and water. Do not scrub. Do not swim or submerge the wounds in water until fully healed.     Apply Bacitracin ointment to wounds twice a day for 5 days.     Ok to leave open to air when at home.     Ok to use scar cream such as Mederma Advanced Scar Gel in about 1 week- when wound is completely closed.     Follow up with Dr. Alvarez as scheduled this Tuesday.

## 2025-05-10 NOTE — ED INITIAL ASSESSMENT (HPI)
Pt here for removal of sutures to forehead and above left eyebrow. Sutures intact without s/sx of infection.

## 2025-05-10 NOTE — ED PROVIDER NOTES
Patient Seen in: [facility]      History     Chief Complaint   Patient presents with    Sut Stap RingRemoval     Stated Complaint: stiches removal    Subjective:   HPI      History of Present Illness               Objective:     Past Medical History:    Abdominal distention    Anxiety    Belching    Bloating    Flatulence/gas pain/belching    Heartburn    Hemorrhoids    Indigestion    Stress    Wears glasses              Past Surgical History:   Procedure Laterality Date    Mirena, iud  3-14-14    Other      R ankle surgery    Other surgical history  2004    right ankle                Social History     Socioeconomic History    Marital status:    Tobacco Use    Smoking status: Some Days     Current packs/day: 0.00     Types: Cigarettes    Smokeless tobacco: Never    Tobacco comments:     Socially   Vaping Use    Vaping status: Never Used   Substance and Sexual Activity    Alcohol use: Yes     Comment: Soc    Drug use: No    Sexual activity: Yes     Partners: Male     Birth control/protection: Mirena     Social Drivers of Health     Food Insecurity: No Food Insecurity (4/1/2025)    Received from [facility]    Food Insecurity     Currently or in the past 3 months, have you worried your food would run out before you had money to buy more?: No     In the past 12 months, have you run out of food or been unable to get more?: No   Recent Concern: Food Insecurity - Food Insecurity Present (2/4/2025)    Received from [facility]    Food Insecurity     Currently or in the past 3 months, have you worried your food would run out before you had money to buy more?: Yes     In the past 12 months, have you run out of food or been unable to get more?: No   Transportation Needs: No Transportation Needs (4/1/2025)    Received from [facility]    Transportation Needs     Currently or in the past 3 months, has lack of transportation kept you from medical  appointments, getting food or medicine, or providing care to a family member?: No     Medical Transportation Needs?: No              Review of Systems    Positive for stated complaint: stiches removal  Other systems are as noted in HPI.  Constitutional and vital signs reviewed.      All other systems reviewed and negative except as noted above.                  Physical Exam     ED Triage Vitals [05/10/25 1030]   BP (!) 132/92   Pulse 76   Resp 18   Temp 97.9 °F (36.6 °C)   Temp src Oral   SpO2 99 %   O2 Device None (Room air)       Current Vitals:   Vital Signs  BP: (!) 132/92  Pulse: 76  Resp: 18  Temp: 97.9 °F (36.6 °C)  Temp src: Oral    Oxygen Therapy  SpO2: 99 %  O2 Device: None (Room air)        Physical Exam      Physical Exam                ED Course   Labs Reviewed - No data to display       Results         Procedures:  Verbal consent obtained.  Time out performed; patient, site, and procedure verified; equipment was available prior to start.    5 sutures from left eyebrow and running suture to left forehead removed without difficulty.  Steri strips not placed.   Wound care discussed.  Patient tolerated well.             MDM      Medical Decision Making      Disposition and Plan     Clinical Impression:  1. Encounter for removal of sutures         Disposition:  Discharge  5/10/2025 11:05 am    Follow-up:  No follow-up provider specified.        Medications Prescribed:  Discharge Medication List as of 5/10/2025 11:05 AM          Supplementary Documentation:

## 2025-05-13 ENCOUNTER — OFFICE VISIT (OUTPATIENT)
Facility: LOCATION | Age: 50
End: 2025-05-13
Payer: COMMERCIAL

## 2025-05-13 DIAGNOSIS — S02.32XD CLOSED FRACTURE OF LEFT ORBITAL FLOOR WITH ROUTINE HEALING, SUBSEQUENT ENCOUNTER: Primary | ICD-10-CM

## 2025-05-13 PROCEDURE — 99204 OFFICE O/P NEW MOD 45 MIN: CPT | Performed by: STUDENT IN AN ORGANIZED HEALTH CARE EDUCATION/TRAINING PROGRAM

## 2025-05-13 NOTE — PROGRESS NOTES
Ainsley Vargas is a 49 year old female.   Chief Complaint   Patient presents with    Trauma     fracture of the left orbital floor with cortical defect measuring 9 mm in transverse length and with extension of cortical bone fragments and intraorbital fat into the adjacent left maxillary sinus     HPI:   49 year old female hx alcohol use disorder presenting for evaluation of a left orbital floor fracture.  Patient had a fall on 5/3 and presented to the ER with a forehead laceration.  Imaging revealed a left orbital floor fracture.  She denies blurry vision, double vision and pain with eye movement.  She has been compliant with antibiotics and sinus precautions.    Current Medications[1]   Past Medical History[2]   Social History:  Short Social Hx on File[3]   Past Surgical History[4]      REVIEW OF SYSTEMS:   GENERAL HEALTH: feels well otherwise  GENERAL : denies fever, chills, sweats, weight loss, weight gain  SKIN: denies any unusual skin lesions or rashes  RESPIRATORY: denies shortness of breath with exertion  NEURO: denies headaches    EXAM:   LMP  (LMP Unknown)     System Findings Details   Constitutional  Overall appearance - Normal.   Head/Face  Facial features -- Normal. Skull - Normal.   Eyes  Sclera white, pupils equal and round, EOMI, on entrapment, left conjunctival hemorrhage, no enophthalmos   Ears  External ears normal in appearance, bilateral EACs occluded with cerumen   Nose  External nose normal in appearance, nares patent, septum straight, no epistaxis   Throat  Posterior pharynx clear, uvula midline, tonsils 1+   Oral cavity  Lips normal in appearance, mucous membranes clear, no masses, FOM soft, tongue without lesions   Neck  Trachea midline, no lymphadenopathy, no masses   Neurological  Memory - Normal. Cranial nerves - HB V on left forehead s/p laceration, laceration well healing     CT facial bones 5/3/2025  Images personally reviewed, interpretation below  Left orbital floor was 0.8 cm  fracture with herniation of orbital fat, no herniation of inferior rectus muscle    ASSESSMENT AND PLAN:   49 year old female hx alcohol use disorder presenting for evaluation of a left orbital floor fracture.     -No surgical intervention indicated given small size and lack of entrapment and enophthalmos.  - Continue sinus precautions for 4 additional days (total of 2 weeks)  - Continue antibiotics as prescribed    The patient indicates understanding of these issues and agrees to the plan.    Lora Alvarez MD, ISAIAS  Facial Plastic & Reconstructive Surgery, Otolaryngology-Head & Neck Surgery  [facility]    This note was prepared using Dragon [employer] voice recognition dictation software. As a result, errors may occur. When identified, these errors have been corrected. While every attempt is made to correct errors during dictation, discrepancies may still exist.          [1]   Current Outpatient Medications   Medication Sig Dispense Refill    amoxicillin clavulanate 875-125 MG Oral Tab Take 1 tablet by mouth 2 (two) times daily for 10 days. 20 tablet 0   [2]   Past Medical History:   Abdominal distention    Anxiety    Belching    Bloating    Flatulence/gas pain/belching    Heartburn    Hemorrhoids    Indigestion    Stress    Wears glasses   [3]   Social History  Socioeconomic History    Marital status:    Tobacco Use    Smoking status: Some Days     Current packs/day: 0.00     Types: Cigarettes    Smokeless tobacco: Never    Tobacco comments:     Socially   Vaping Use    Vaping status: Never Used   Substance and Sexual Activity    Alcohol use: Yes     Comment: Soc    Drug use: No    Sexual activity: Yes     Partners: Male     Birth control/protection: Mirena     Social Drivers of Health     Food Insecurity: No Food Insecurity (4/1/2025)    Received from [facility]    Food Insecurity     Currently or in the past 3 months, have you worried your food would run out before you had money  to buy more?: No     In the past 12 months, have you run out of food or been unable to get more?: No   Recent Concern: Food Insecurity - Food Insecurity Present (2/4/2025)    Received from [facility]    Food Insecurity     Currently or in the past 3 months, have you worried your food would run out before you had money to buy more?: Yes     In the past 12 months, have you run out of food or been unable to get more?: No   Transportation Needs: No Transportation Needs (4/1/2025)    Received from [facility]    Transportation Needs     Currently or in the past 3 months, has lack of transportation kept you from medical appointments, getting food or medicine, or providing care to a family member?: No     Medical Transportation Needs?: No   [4]   Past Surgical History:  Procedure Laterality Date    Mirena, iud  3-14-14    Other      R ankle surgery    Other surgical history  2004    right ankle

## (undated) NOTE — ED AVS SNAPSHOT
[facility] Emergency Department    [place]  [address] [address]    Phone:  [phone]    Fax:  [address] [facility]   MRN: [id number]    Department:  [facility] Emergency Department   Date of Visit:  2 IF THERE IS ANY CHANGE OR WORSENING OF YOUR CONDITION, CALL YOUR PRIMARY CARE PHYSICIAN AT ONCE OR RETURN IMMEDIATELY TO THE EMERGENCY DEPARTMENT.     If you have been prescribed any medication(s), please fill your prescription right away and begin taking t

## (undated) NOTE — ED AVS SNAPSHOT
[facility] Emergency Department    [place]  [address] [address]    Phone:  [phone]    Fax:  [address] [facility]   MRN: [id number]    Department:  [facility] Emergency Department   Date of Visit:  2 Bring a paper prescription for each of these medications    - HYDROcodone-acetaminophen 5-325 MG Tabs  - ibuprofen 600 MG Tabs              Discharge Instructions       Plan ice to sore areas of your back.   Follow-up with your PCP or spine surgery this we primary care or specialist physician will see patients referred from the [facility] Emergency Department. Follow-up care is at the discretion of that Physician.     IF THERE IS ANY CHANGE OR WORSENING OF YOUR CONDITION, CALL YOUR PRIMARY CARE PHYSICIAN - If you have concerns related to behavioral health issues or thoughts of harming yourself, contact [address] [place] [address] at [phone].     - If you don’t have insurance, Debra Solares has partnered with [employer] lower back and tailbone. FINDINGS:      BONES:  Preservation of lumbar vertebral body height. Minimal grade 1 spondylolisthesis at L5-S1. There degenerative changes in the lower lumbar facets bilaterally.  Suggestion of right spondylolysis  [facility]